# Patient Record
Sex: FEMALE | Race: WHITE | NOT HISPANIC OR LATINO | Employment: FULL TIME | URBAN - METROPOLITAN AREA
[De-identification: names, ages, dates, MRNs, and addresses within clinical notes are randomized per-mention and may not be internally consistent; named-entity substitution may affect disease eponyms.]

---

## 2017-11-09 ENCOUNTER — OFFICE VISIT (OUTPATIENT)
Dept: URGENT CARE | Facility: PHYSICIAN GROUP | Age: 27
End: 2017-11-09
Payer: COMMERCIAL

## 2017-11-09 VITALS
DIASTOLIC BLOOD PRESSURE: 72 MMHG | RESPIRATION RATE: 16 BRPM | BODY MASS INDEX: 35.16 KG/M2 | HEART RATE: 90 BPM | WEIGHT: 211 LBS | OXYGEN SATURATION: 98 % | SYSTOLIC BLOOD PRESSURE: 122 MMHG | TEMPERATURE: 100.1 F | HEIGHT: 65 IN

## 2017-11-09 DIAGNOSIS — R50.9 FEVER, UNSPECIFIED FEVER CAUSE: ICD-10-CM

## 2017-11-09 DIAGNOSIS — R11.0 NAUSEA: ICD-10-CM

## 2017-11-09 LAB
FLUAV+FLUBV AG SPEC QL IA: NORMAL
INT CON NEG: NEGATIVE
INT CON POS: POSITIVE

## 2017-11-09 PROCEDURE — 87804 INFLUENZA ASSAY W/OPTIC: CPT | Performed by: NURSE PRACTITIONER

## 2017-11-09 PROCEDURE — 99204 OFFICE O/P NEW MOD 45 MIN: CPT | Performed by: NURSE PRACTITIONER

## 2017-11-09 RX ORDER — ONDANSETRON 4 MG/1
4 TABLET, ORALLY DISINTEGRATING ORAL EVERY 8 HOURS PRN
Qty: 10 TAB | Refills: 0 | Status: SHIPPED | OUTPATIENT
Start: 2017-11-09 | End: 2020-06-17

## 2017-11-09 ASSESSMENT — ENCOUNTER SYMPTOMS
VOMITING: 0
EYE PAIN: 0
SORE THROAT: 0
FEVER: 1
HEADACHES: 1
SHORTNESS OF BREATH: 0
CHILLS: 1
NAUSEA: 1
ABDOMINAL PAIN: 1
MYALGIAS: 0
DIARRHEA: 1
DIZZINESS: 0

## 2017-11-09 NOTE — LETTER
November 9, 2017         Patient: Amber Hobbs   YOB: 1990   Date of Visit: 11/9/2017           To Whom it May Concern:    Amber Hobbs was seen in my clinic on 11/9/2017. She may return to work on 11/11/17.    If you have any questions or concerns, please don't hesitate to call.        Sincerely,           JORDAN Odonnell.  Electronically Signed

## 2017-11-09 NOTE — PROGRESS NOTES
"Subjective:   Amber Dominguez a 26 y.o. female who presents for Diarrhea (nausea, minor dizziness x1 week)        Diarrhea    This is a new problem. The current episode started in the past 7 days. The problem occurs 2 to 4 times per day. The problem has been gradually worsening. The stool consistency is described as watery. The patient states that diarrhea does not awaken her from sleep. Associated symptoms include abdominal pain, chills, a fever and headaches. Pertinent negatives include no myalgias or vomiting. Nothing aggravates the symptoms. There are no known risk factors. She has tried bismuth subsalicylate for the symptoms. The treatment provided no relief.     Review of Systems   Constitutional: Positive for chills, fever and malaise/fatigue.   HENT: Negative for sore throat.    Eyes: Negative for pain.   Respiratory: Negative for shortness of breath.    Cardiovascular: Negative for chest pain.   Gastrointestinal: Positive for abdominal pain, diarrhea and nausea. Negative for vomiting.   Genitourinary: Negative for hematuria.   Musculoskeletal: Negative for myalgias.   Skin: Negative for rash.   Neurological: Positive for headaches. Negative for dizziness.     No Known Allergies   Objective:   /72   Pulse 90   Temp 37.8 °C (100.1 °F)   Resp 16   Ht 1.651 m (5' 5\")   Wt 95.7 kg (211 lb)   SpO2 98%   Breastfeeding? No   BMI 35.11 kg/m²   Physical Exam   Constitutional: She is oriented to person, place, and time. She appears well-developed and well-nourished. No distress.   HENT:   Head: Normocephalic and atraumatic.   Right Ear: Tympanic membrane normal.   Left Ear: Tympanic membrane normal.   Mouth/Throat: Oropharynx is clear and moist. No tonsillar exudate.   Eyes: Conjunctivae and EOM are normal. Pupils are equal, round, and reactive to light.   Cardiovascular: Normal rate and regular rhythm.    No murmur heard.  Pulmonary/Chest: Effort normal and breath sounds normal. No respiratory " distress.   Abdominal: Soft. Bowel sounds are normal. She exhibits no distension. There is no hepatosplenomegaly. There is no tenderness. There is no rigidity, no rebound and no guarding.   Neurological: She is alert and oriented to person, place, and time. She has normal reflexes. No sensory deficit.   Skin: Skin is warm and dry.   Psychiatric: She has a normal mood and affect.         Assessment/Plan:   Assessment    1. Fever, unspecified fever cause  POCT Influenza A/B    ondansetron (ZOFRAN ODT) 4 MG TABLET DISPERSIBLE   2. Nausea     Influenza negative.  Encouraged to continue supportive care, may continue Pepto-Bismol as needed, Zofran for nausea.  It was explained to the pt. Today that due to the viral nature of the pt's illness, we will treat symptomatically today. Encouraged OTC supportive meds PRN.   ER precautions given- if worsening, develops SOB, muffled voice, any respiratory distress, or CP they are to go directly to the ER if we are not available.   Pt. Understands and agrees with the plan.   RTC as needed or to their PCP.

## 2019-11-21 ENCOUNTER — OFFICE VISIT (OUTPATIENT)
Dept: URGENT CARE | Facility: CLINIC | Age: 29
End: 2019-11-21
Payer: COMMERCIAL

## 2019-11-21 VITALS
HEIGHT: 65 IN | WEIGHT: 197 LBS | SYSTOLIC BLOOD PRESSURE: 122 MMHG | DIASTOLIC BLOOD PRESSURE: 78 MMHG | OXYGEN SATURATION: 99 % | BODY MASS INDEX: 32.82 KG/M2 | TEMPERATURE: 99.6 F | HEART RATE: 68 BPM

## 2019-11-21 DIAGNOSIS — R05.9 COUGH: ICD-10-CM

## 2019-11-21 DIAGNOSIS — J01.00 ACUTE NON-RECURRENT MAXILLARY SINUSITIS: ICD-10-CM

## 2019-11-21 PROCEDURE — 99214 OFFICE O/P EST MOD 30 MIN: CPT | Performed by: PHYSICIAN ASSISTANT

## 2019-11-22 RX ORDER — BENZONATATE 100 MG/1
100-200 CAPSULE ORAL 3 TIMES DAILY PRN
Qty: 60 CAP | Refills: 0 | Status: SHIPPED | OUTPATIENT
Start: 2019-11-22 | End: 2020-06-17

## 2019-11-22 RX ORDER — AMOXICILLIN AND CLAVULANATE POTASSIUM 875; 125 MG/1; MG/1
1 TABLET, FILM COATED ORAL 2 TIMES DAILY
Qty: 14 TAB | Refills: 0 | Status: SHIPPED | OUTPATIENT
Start: 2019-11-21 | End: 2019-11-28

## 2019-11-22 ASSESSMENT — ENCOUNTER SYMPTOMS
DIZZINESS: 0
DIARRHEA: 0
MYALGIAS: 0
MUSCULOSKELETAL NEGATIVE: 1
VOMITING: 0
HEADACHES: 1
SORE THROAT: 1
HEMOPTYSIS: 0
CHILLS: 1
RHINORRHEA: 1
SHORTNESS OF BREATH: 0
FEVER: 0
NAUSEA: 0
WHEEZING: 0
SPUTUM PRODUCTION: 0
ABDOMINAL PAIN: 0
COUGH: 1
SINUS PAIN: 1

## 2019-11-22 ASSESSMENT — COPD QUESTIONNAIRES: COPD: 0

## 2019-11-22 NOTE — PROGRESS NOTES
"Subjective:      Amber Hobbs is a 29 y.o. female who presents with Cough (cough, cold, sore throat)            Cough   This is a new problem. The current episode started 1 to 4 weeks ago (2 weeks). The problem has been unchanged. The problem occurs every few minutes. The cough is productive of brown sputum and productive of sputum. Associated symptoms include chills, headaches, nasal congestion, postnasal drip, rhinorrhea and a sore throat. Pertinent negatives include no chest pain, ear pain, fever, hemoptysis, myalgias, rash, shortness of breath or wheezing. Nothing aggravates the symptoms. She has tried OTC cough suppressant for the symptoms. The treatment provided mild relief. There is no history of asthma, COPD or pneumonia.       Review of Systems   Constitutional: Positive for chills. Negative for fever.   HENT: Positive for congestion, postnasal drip, rhinorrhea, sinus pain and sore throat. Negative for ear pain.    Respiratory: Positive for cough. Negative for hemoptysis, sputum production, shortness of breath and wheezing.    Cardiovascular: Negative for chest pain.   Gastrointestinal: Negative for abdominal pain, diarrhea, nausea and vomiting.   Genitourinary: Negative.    Musculoskeletal: Negative.  Negative for myalgias.   Skin: Negative for rash.   Neurological: Positive for headaches. Negative for dizziness.        Objective:     /78   Pulse 68   Temp 37.6 °C (99.6 °F)   Ht 1.651 m (5' 5\")   Wt 89.4 kg (197 lb)   SpO2 99%   BMI 32.78 kg/m²      Physical Exam  Vitals signs and nursing note reviewed.   Constitutional:       General: She is not in acute distress.     Appearance: Normal appearance. She is well-developed. She is not diaphoretic.   HENT:      Head: Normocephalic and atraumatic.      Right Ear: Hearing, tympanic membrane, ear canal and external ear normal. There is no impacted cerumen.      Left Ear: Hearing, tympanic membrane, ear canal and external ear normal. There is " no impacted cerumen.      Nose: Mucosal edema, congestion and rhinorrhea present.      Right Sinus: Maxillary sinus tenderness present. No frontal sinus tenderness.      Left Sinus: Maxillary sinus tenderness present. No frontal sinus tenderness.      Mouth/Throat:      Mouth: Mucous membranes are moist.      Pharynx: No oropharyngeal exudate or posterior oropharyngeal erythema.   Eyes:      Conjunctiva/sclera: Conjunctivae normal.      Pupils: Pupils are equal, round, and reactive to light.   Neck:      Musculoskeletal: Normal range of motion.   Cardiovascular:      Rate and Rhythm: Normal rate.      Heart sounds: Normal heart sounds. No murmur.   Pulmonary:      Effort: Pulmonary effort is normal.      Breath sounds: Normal breath sounds. No wheezing.   Musculoskeletal: Normal range of motion.   Skin:     General: Skin is warm and dry.   Neurological:      Mental Status: She is alert and oriented to person, place, and time.   Psychiatric:         Behavior: Behavior normal.            PMH:  has no past medical history on file.  MEDS:   Current Outpatient Medications:   •  amoxicillin-clavulanate (AUGMENTIN) 875-125 MG Tab, Take 1 Tab by mouth 2 times a day for 7 days., Disp: 14 Tab, Rfl: 0  •  benzonatate (TESSALON) 100 MG Cap, Take 1-2 Caps by mouth 3 times a day as needed., Disp: 60 Cap, Rfl: 0  •  etonogestrel (NEXPLANON) 68 MG Implant implant, Inject 1 Each as instructed Once., Disp: , Rfl:   •  ondansetron (ZOFRAN ODT) 4 MG TABLET DISPERSIBLE, Take 1 Tab by mouth every 8 hours as needed., Disp: 10 Tab, Rfl: 0  ALLERGIES: No Known Allergies  SURGHX: History reviewed. No pertinent surgical history.  SOCHX:  reports that she has never smoked. She has never used smokeless tobacco. She reports current alcohol use. She reports that she does not use drugs.  FH: family history is not on file.       Assessment/Plan:       1. Acute non-recurrent maxillary sinusitis  - amoxicillin-clavulanate (AUGMENTIN) 875-125 MG Tab;  Take 1 Tab by mouth 2 times a day for 7 days.  Dispense: 14 Tab; Refill: 0   - Complete full course of antibiotics as prescribed     Discussed use of nedi-pot, humidifier, OTC decongestant, and Flonase nasal spray for symptomatic relief. Tessalon perles as needed for cough. Call or return to office if symptoms persist or worsen. The patient demonstrated a good understanding and agreed with the treatment plan.      2. Cough  - benzonatate (TESSALON) 100 MG Cap; Take 1-2 Caps by mouth 3 times a day as needed.  Dispense: 60 Cap; Refill: 0

## 2020-06-17 ENCOUNTER — HOSPITAL ENCOUNTER (EMERGENCY)
Facility: MEDICAL CENTER | Age: 30
End: 2020-06-17
Attending: EMERGENCY MEDICINE
Payer: COMMERCIAL

## 2020-06-17 VITALS
HEIGHT: 65 IN | OXYGEN SATURATION: 98 % | RESPIRATION RATE: 18 BRPM | HEART RATE: 70 BPM | TEMPERATURE: 99 F | BODY MASS INDEX: 35.59 KG/M2 | WEIGHT: 213.63 LBS | SYSTOLIC BLOOD PRESSURE: 119 MMHG | DIASTOLIC BLOOD PRESSURE: 77 MMHG

## 2020-06-17 DIAGNOSIS — S09.90XA CLOSED HEAD INJURY, INITIAL ENCOUNTER: ICD-10-CM

## 2020-06-17 PROCEDURE — 99282 EMERGENCY DEPT VISIT SF MDM: CPT

## 2020-06-17 RX ORDER — NAPROXEN SODIUM 220 MG
440 TABLET ORAL
COMMUNITY

## 2020-06-17 NOTE — ED NOTES
Pt given discharge instructions. RN answered questions. VSS. Pt ambulated steadily out to [facility].

## 2020-06-17 NOTE — LETTER
"  FORM C-4:  EMPLOYEE’S CLAIM FOR COMPENSATION/ REPORT OF INITIAL TREATMENT  EMPLOYEE’S CLAIM - PROVIDE ALL INFORMATION REQUESTED   First Name Amber Last Name Savanah Hobbs Birthdate 1990  Sex female Claim Number   Home Employee Address [address]                                     Zip  [address] Height  1.651 m (5' 5\") Weight  96.9 kg (213 lb 10 oz) [facility]     Mailing Employee Address [address]               Zip  [address] Telephone  [phone] (home)  Primary Language Spoken   Insurer  [employer] Third Party   ADRIANNA CLAIMS [employer] Employee's Occupation (Job 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 (if applicable) Lifting 40lb water    How did this injury or occupational disease occur? Be specific and answer in detail. Use additional sheet if necessary)  Lifitng case of water, over compensated for the weight of the water and head hit the steel rack. No loss of conciousness but immidiate blurry vison and pain   If you believe that you have an occupational disease, when did you first have knowledge of the disability and it relationship to your employment? n/a Witnesses to the Accident  n/a   Nature of Injury or Occupational Disease  Contusion Part(s) of Body Injured or Affected  Skull, N/A, N/A    I CERTIFY THAT THE ABOVE IS TRUE AND CORRECT TO THE BEST OF MY KNOWLEDGE AND THAT I HAVE PROVIDED THIS INFORMATION IN ORDER TO OBTAIN THE " BENEFITS OF [place]).  I HEREBY AUTHORIZE ANY PHYSICIAN, CHIROPRACTOR, SURGEON, PRACTITIONER, OR OTHER PERSON, ANY HOSPITAL, INCLUDING [facility] OR [facility], ANY MEDICAL SERVICE ORGANIZATION, ANY INSURANCE COMPANY, OR OTHER INSTITUTION OR ORGANIZATION TO RELEASE TO EACH OTHER, ANY MEDICAL OR OTHER INFORMATION, INCLUDING BENEFITS PAID OR PAYABLE, PERTINENT TO THIS INJURY OR DISEASE, EXCEPT INFORMATION RELATIVE TO DIAGNOSIS, TREATMENT AND/OR COUNSELING FOR AIDS, PSYCHOLOGICAL CONDITIONS, ALCOHOL OR CONTROLLED SUBSTANCES, FOR WHICH I MUST GIVE SPECIFIC AUTHORIZATION.  A PHOTOSTAT OF THIS AUTHORIZATION SHALL BE AS VALID AS THE ORIGINAL.  Date June 17, 2020       Place [facility]                Employee’s Signature   THIS REPORT MUST BE COMPLETED AND MAILED WITHIN 3 WORKING DAYS OF TREATMENT   Place [facility], EMERGENCY DEPT                                                                             Name of Facility [facility]   Date  6/17/2020 Diagnosis  (S09.90XA) Closed head injury, initial encounter Is there evidence the injured employee was under the influence of alcohol and/or another controlled substance at the time of accident?   Hour  1:17 PM Description of Injury or Disease  Closed head injury, initial encounter No   Treatment  Was evaluated the emergency department has no significant findings.  At this point is recommended head injury instructions to return if any symptoms worsen follow-up with occupational health if there is any continued symptoms in 1 week  Have you advised the patient to remain off work five days or more?         No   X-Ray Findings    If Yes   From Date    To Date      From information given by the employee, together with medical evidence, can you directly connect this injury or occupational disease as  "job incurred? Yes If No, is employee capable of: Full Duty  Yes Modified Duty      Is additional medical care by a physician indicated? Yes  Comments:If symptoms continue greater than 2 days recommend to follow-up with occupational health If Modified Duty, Specify any Limitations / Restrictions       Do you know of any previous injury or disease contributing to this condition or occupational disease? No    Date 6/17/2020 Print Doctor’s Name Jose Alfredo Maravilla certify the employer’s copy of this form was mailed on:   Address [address] [place] [id number] [phone] INSURER’S USE ONLY   Provider’s Tax ID Number [id number] Telephone Dept: [phone]    Doctor’s Signature e-JOSE ALFREDO Sepulveda M.D. Degree     MD      Form C-4 (rev.10/07)                                                                         BRIEF DESCRIPTION OF RIGHTS AND BENEFITS  (Pursuant to [place] 616[place].050)    Notice of Injury or Occupational Disease (Incident Report Form C-1): If an injury or occupational disease (OD) arises out of and in the course of employment, you must provide written notice to your employer as soon as practicable, but no later than 7 days after the accident or OD. Your employer shall maintain a sufficient supply of the required forms.    Claim for Compensation (Form C-4): If medical treatment is sought, the form C-4 is available at the place of initial treatment. A completed \"Claim for Compensation\" (Form C-4) must be filed within 90 days after an accident or OD. The treating physician or chiropractor must, within 3 working days after treatment, complete and mail to the employer, the employer's insurer and third-party , the Claim for Compensation.    Medical Treatment: If you require medical treatment for your on-the-job injury or OD, you may be required to select a physician or chiropractor from a list provided by your workers’ compensation insurer, if it has contracted with an Organization for " Managed Care (MCO) or Preferred Provider Organization (PPO) or providers of health care. If your employer has not entered into a contract with an MCO or PPO, you may select a physician or chiropractor from the Panel of Physicians and Chiropractors. Any medical costs related to your industrial injury or OD will be paid by your insurer.    Temporary Total Disability (TTD): If your doctor has certified that you are unable to work for a period of at least 5 consecutive days, or 5 cumulative days in a 20-day period, or places restrictions on you that your employer does not accommodate, you may be entitled to TTD compensation.    Temporary Partial Disability (TPD): If the wage you receive upon reemployment is less than the compensation for TTD to which you are entitled, the insurer may be required to pay you TPD compensation to make up the difference. TPD can only be paid for a maximum of 24 months.    Permanent Partial Disability (PPD): When your medical condition is stable and there is an indication of a PPD as a result of your injury or OD, within 30 days, your insurer must arrange for an evaluation by a rating physician or chiropractor to determine the degree of your PPD. The amount of your PPD award depends on the date of injury, the results of the PPD evaluation and your age and wage.    Permanent Total Disability (PTD): If you are medically certified by a treating physician or chiropractor as permanently and totally disabled and have been granted a PTD status by your insurer, you are entitled to receive monthly benefits not to exceed 66 2/3% of your average monthly wage. The amount of your PTD payments is subject to reduction if you previously received a PPD award.    Vocational Rehabilitation Services: You may be eligible for vocational rehabilitation services if you are unable to return to the job due to a permanent physical impairment or permanent restrictions as a result of your injury or occupational  disease.    Transportation and Per Lucila Reimbursement: You may be eligible for travel expenses and per lucila associated with medical treatment.    Reopening: You may be able to reopen your claim if your condition worsens after claim closure.     Appeal Process: If you disagree with a written determination issued by the insurer or the insurer does not respond to your request, you may appeal to the Department of Administration, , by following the instructions contained in your determination letter. You must appeal the determination within 70 days from the date of the determination letter at [address], [address], [place], [place] [address], or [address], [address], [place], [place] [address]. If you disagree with the  decision, you may appeal to the Department of Administration, . You must file your appeal within 30 days from the date of the  decision letter at [address], [address], [place], [place] [address], or [address], [place], [place] [address]. If you disagree with a decision of an , you may file a petition for judicial review with the District Court. You must do so within 30 days of the Appeal Officer’s decision. You may be represented by an  at your own expense or you may contact the [facility] for possible representation.    [place]  for Injured Workers (NAIW): If you disagree with a  decision, you may request that NAIW represent you without charge at an  Hearing. For information regarding denial of benefits, you may contact the [facility] at: [address], [address], [place] [address], [phone], or [address] [facility] [address] [facility] [address], [place] [address], [phone]    To File a Complaint with the Division: If you wish to file a complaint with the  of the Division of Industrial Relations (DIR),  please contact the  Workers’ Compensation Section, [address], [address], [place], [place] [address], telephone (006) [phone], or [address], [address], [place], [place] [address], telephone [phone].    For assistance with Workers’ Compensation Issues: You may contact the Office of the Governor Consumer Health Assistance, [address] [place] [address], [address], [place], [place] [address], Toll Free 1-660.183.8853, Web site: http://[employer].nv., E-mail maria [email or web address].  D-2 (rev. 06/18)              __________________________________________________________________                                    June 17, 2020            Employee Name / Signature                                                                                                                            Date

## 2020-06-17 NOTE — LETTER
"  FORM C-4:  EMPLOYEE’S CLAIM FOR COMPENSATION/ REPORT OF INITIAL TREATMENT  EMPLOYEE’S CLAIM - PROVIDE ALL INFORMATION REQUESTED   First Name Amber Last Name Savanah Hobbs Birthdate 1990  Sex female Claim Number   Home Employee Address [address]                                     Zip  [address] Height  1.651 m (5' 5\") Weight  96.9 kg (213 lb 10 oz) [facility]  [id number]   Mailing Employee Address [address]               Zip  [address] Telephone  [phone] (home)  Primary Language Spoken   Insurer  *** Third Party   ADRIANNA CLAIMS [employer] Employee's Occupation (Job Title) 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 (if applicable) Lifting 40lb water    How did this injury or occupational disease occur? Be specific and answer in detail. Use additional sheet if necessary)  Lifitng case of water, over compensated for the weight of the water and head hit the steel rack. No loss of conciousness but immidiate blurry vison and pain   If you believe that you have an occupational disease, when did you first have knowledge of the disability and it relationship to your employment? n/a Witnesses to the Accident  n/a   Nature of Injury or Occupational Disease  Contusion Part(s) of Body Injured or Affected  Skull, N/A, N/A    I CERTIFY THAT THE ABOVE IS TRUE AND CORRECT TO THE BEST OF MY KNOWLEDGE AND THAT I HAVE PROVIDED THIS INFORMATION IN ORDER TO OBTAIN THE BENEFITS OF [place]).  I HEREBY AUTHORIZE ANY PHYSICIAN, CHIROPRACTOR, SURGEON, PRACTITIONER, OR OTHER PERSON, ANY HOSPITAL, INCLUDING [facility] OR [facility], ANY MEDICAL SERVICE ORGANIZATION, ANY INSURANCE COMPANY, OR OTHER INSTITUTION OR ORGANIZATION TO RELEASE TO EACH OTHER, ANY MEDICAL OR OTHER INFORMATION, INCLUDING BENEFITS PAID OR PAYABLE, PERTINENT TO THIS INJURY OR DISEASE, EXCEPT INFORMATION RELATIVE TO DIAGNOSIS, TREATMENT AND/OR COUNSELING FOR AIDS, PSYCHOLOGICAL CONDITIONS, ALCOHOL OR CONTROLLED SUBSTANCES, FOR WHICH I MUST GIVE SPECIFIC AUTHORIZATION.  A PHOTOSTAT OF THIS AUTHORIZATION SHALL BE AS VALID AS THE ORIGINAL.  Date                                      Place                                                                             Employee’s Signature   THIS REPORT MUST BE COMPLETED AND MAILED WITHIN 3 WORKING DAYS OF TREATMENT   Place [facility], EMERGENCY DEPT                                                                             Name of Facility [facility]   Date  6/17/2020 Diagnosis  (S09.90XA) Closed head injury, initial encounter Is there evidence the injured employee was under the influence of alcohol and/or another controlled substance at the time of accident?   Hour  1:13 PM Description of Injury or Disease  Closed head injury, initial encounter     Treatment     Have you advised the patient to remain off work five days or more?             X-Ray Findings    If Yes   From Date    To Date      From information given by the employee, together with medical evidence, can you directly connect this injury or occupational disease as job incurred?   If No, is employee capable of: Full Duty    Modified Duty      Is additional medical care by a physician indicated?   If Modified Duty, Specify any Limitations / Restrictions       Do  "you know of any previous injury or disease contributing to this condition or occupational disease?      Date 6/17/2020 Print Doctor’s Name Maravilla, Jose Alfredo RIGGS RADHA certify the employer’s copy of this form was mailed on:   Address [address] [id number] [phone] INSURER’S USE ONLY   Provider’s Tax ID Number [id number] Telephone Dept: [phone]    Doctor’s Signature   Degree        Form C-4 (rev.10/07)                                                                         BRIEF DESCRIPTION OF RIGHTS AND BENEFITS  (Pursuant to [place] 616C.050)    Notice of Injury or Occupational Disease (Incident Report Form C-1): If an injury or occupational disease (OD) arises out of and in the course of employment, you must provide written notice to your employer as soon as practicable, but no later than 7 days after the accident or OD. Your employer shall maintain a sufficient supply of the required forms.    Claim for Compensation (Form C-4): If medical treatment is sought, the form C-4 is available at the place of initial treatment. A completed \"Claim for Compensation\" (Form C-4) must be filed within 90 days after an accident or OD. The treating physician or chiropractor must, within 3 working days after treatment, complete and mail to the employer, the employer's insurer and third-party , the Claim for Compensation.    Medical Treatment: If you require medical treatment for your on-the-job injury or OD, you may be required to select a physician or chiropractor from a list provided by your workers’ compensation insurer, if it has contracted with an Organization for Managed Care (MCO) or Preferred Provider Organization (PPO) or providers of health care. If your employer has not entered into a contract with an MCO or PPO, you may select a physician or chiropractor from the Panel of Physicians and Chiropractors. Any medical costs related to your industrial injury or OD will be paid by your " insurer.    Temporary Total Disability (TTD): If your doctor has certified that you are unable to work for a period of at least 5 consecutive days, or 5 cumulative days in a 20-day period, or places restrictions on you that your employer does not accommodate, you may be entitled to TTD compensation.    Temporary Partial Disability (TPD): If the wage you receive upon reemployment is less than the compensation for TTD to which you are entitled, the insurer may be required to pay you TPD compensation to make up the difference. TPD can only be paid for a maximum of 24 months.    Permanent Partial Disability (PPD): When your medical condition is stable and there is an indication of a PPD as a result of your injury or OD, within 30 days, your insurer must arrange for an evaluation by a rating physician or chiropractor to determine the degree of your PPD. The amount of your PPD award depends on the date of injury, the results of the PPD evaluation and your age and wage.    Permanent Total Disability (PTD): If you are medically certified by a treating physician or chiropractor as permanently and totally disabled and have been granted a PTD status by your insurer, you are entitled to receive monthly benefits not to exceed 66 2/3% of your average monthly wage. The amount of your PTD payments is subject to reduction if you previously received a PPD award.    Vocational Rehabilitation Services: You may be eligible for vocational rehabilitation services if you are unable to return to the job due to a permanent physical impairment or permanent restrictions as a result of your injury or occupational disease.    Transportation and Per Lucila Reimbursement: You may be eligible for travel expenses and per lucila associated with medical treatment.    Reopening: You may be able to reopen your claim if your condition worsens after claim closure.     Appeal Process: If you disagree with a written determination issued by the insurer or the  insurer does not respond to your request, you may appeal to the Department of Administration, , by following the instructions contained in your determination letter. You must appeal the determination within 70 days from the date of the determination letter at [address], [address], [place], [place] [address], or [address], [address], [place], [place] [address]. If you disagree with the  decision, you may appeal to the Department of Administration, . You must file your appeal within 30 days from the date of the  decision letter at [address], [address], [place], [place] [address], or [address], [address], [place], [place] [address]. If you disagree with a decision of an , you may file a petition for judicial review with the District Court. You must do so within 30 days of the Appeal Officer’s decision. You may be represented by an  at your own expense or you may contact the [facility] for possible representation.    [place]  for Injured Workers (NAIW): If you disagree with a  decision, you may request that NAIW represent you without charge at an  Hearing. For information regarding denial of benefits, you may contact the [facility] at: [address], [address], [place] [address], [phone], or [address], [place] [address], [phone]    To File a Complaint with the Division: If you wish to file a complaint with the  of the Division of Industrial Relations (DIR),  please contact the Workers’ Compensation Section, [address], [address], [place], [place] [address], telephone [phone], or [address], [address], [place], [place] [address], telephone [phone].    For assistance with Workers’ Compensation Issues: You may contact the Office of the Governor Consumer Health Assistance, [address], [address], [place], [place] [address], Toll Free 1-718.602.4610, Web site: [email or web address]., E-mail maria [email or web address].  D-2 (rev. 06/18)              __________________________________________________________________                                    _________________            Employee Name / Signature                                                                                                                            Date

## 2020-06-17 NOTE — ED PROVIDER NOTES
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 female who presents to the ED with complaints of head pain.  The patient was at [employer] where she works she was picking up products stood up and hit her head on shelving at about 8:00 this morning.  The patient did see stars felt head pain but denies any loss of consciousness.  Patient states that she still feels \"out of the body\" and still complaining of pain.  Pretty much in the right posterior aspect of the head was actually feeling improved initially she was nauseated now she is no longer having any nausea.  Currently she denies any visual acuity changes denies any nausea vomiting she does describe right-sided posterior head pain I does feel slow and sluggish which is not unlike her when she gets a migraine headache.  Patient denies any other symptoms presents for evaluation.    REVIEW OF SYSTEMS  See HPI for further details. All other systems are negative.     PAST MEDICAL HISTORY  History reviewed. No pertinent past medical history.    FAMILY HISTORY  History reviewed. No pertinent family history.    SOCIAL HISTORY  Social History     Socioeconomic History   • Marital status:      Spouse name: Not on file   • Number of children: Not on file   • Years of education: Not on file   • Highest education level: Not on file   Occupational History   • Not on file   Social Needs   • Financial resource strain: Not on file   • Food insecurity     Worry: Not on file     Inability: Not on file   • Transportation needs     Medical: Not on file     Non-medical: Not on file   Tobacco Use   • Smoking status: Never Smoker   • Smokeless tobacco: Never Used   Substance and Sexual Activity   • Alcohol use: Yes     Comment: socially   • Drug use: Yes     Types: " "Inhaled     Comment: THC   • Sexual activity: Yes     Partners: Male     Birth control/protection: Implant   Lifestyle   • Physical activity     Days per week: Not on file     Minutes per session: Not on file   • Stress: Not on file   Relationships   • Social connections     Talks on phone: Not on file     Gets together: Not on file     Attends Islam service: Not on file     Active member of club or organization: Not on file     Attends meetings of clubs or organizations: Not on file     Relationship status: Not on file   • Intimate partner violence     Fear of current or ex partner: Not on file     Emotionally abused: Not on file     Physically abused: Not on file     Forced sexual activity: Not on file   Other Topics Concern   • Not on file   Social History Narrative   • Not on file      Pcp Pt States None  The patient denies any significant tobacco, alcohol or drug use    SURGICAL HISTORY  History reviewed. No pertinent surgical history.    CURRENT MEDICATIONS  Home Medications    **Home medications have not yet been reviewed for this encounter**       No current facility-administered medications on file prior to encounter.      Current Outpatient Medications on File Prior to Encounter   Medication Sig Dispense Refill   • benzonatate (TESSALON) 100 MG Cap Take 1-2 Caps by mouth 3 times a day as needed. 60 Cap 0   • etonogestrel (NEXPLANON) 68 MG Implant implant Inject 1 Each as instructed Once.     • ondansetron (ZOFRAN ODT) 4 MG TABLET DISPERSIBLE Take 1 Tab by mouth every 8 hours as needed. 10 Tab 0         ALLERGIES  No Known Allergies    PHYSICAL EXAM  VITAL SIGNS: /84   Pulse 79   Temp 37.2 °C (99 °F) (Temporal)   Resp 16   Ht 1.651 m (5' 5\")   Wt 96.9 kg (213 lb 10 oz)   SpO2 99%   Breastfeeding No   BMI 35.55 kg/m²    Pulse Oximetry was obtained. It showed a reading of Pulse Oximetry: 99 %.  I interpreted this as non hypoxic.     Constitutional: Well developed, Well nourished, No acute " distress, Non-toxic appearance.   HENT: Normocephalic, Atraumatic no signs of cephalhematoma, Bilateral external ears normal, bilateral tympanic membranes normal, Oropharynx mucous membranes, No oral exudates, Nose normal.   Eyes:  conjunctiva is normal, there are no signs of exudate.   Neck: Supple, no cervical lymphadenopathy, no meningeal signs.. Tender midline  Lymphatic: No lymphadenopathy noted.   Cardiovascular: Regular rate and rhythm without murmurs gallops or rubs.   Thorax & Lungs: Lungs are clear to auscultation bilaterally, there are no wheezes no rales. Chest wall is nontender.  Abdomen: Soft, nontender nondistended. Bowel sounds are present.   Skin: Warm, Dry, No erythema,   Back: No tenderness, No CVA tenderness.  Musculoskeletal: Good range of motion in all major joints. No tenderness to palpation or major deformities noted. Intact distal pulses, no clubbing, no cyanosis, no edema     Neurologic: Alert & oriented x 3, Normal motor function, Normal sensory function, No focal deficits noted.  Nerves II through XII grossly intact DTRs 2+ and equal throughout.  The patient has normal station normal gait grossly normal neurologic examination.  Psychiatric: Affect normal, Judgment normal, Mood normal.       RADIOLOGY/PROCEDURES  No orders to display             COURSE & MEDICAL DECISION MAKING  Pertinent Labs & Imaging studies reviewed. (See chart for details)  Patient presents emerge department for evaluation.  Clinically the patient appears normal there is no signs of significant head trauma the patient is otherwise neurologically intact and normal and actually improving from her time of injury.  At this point I do not feel CT scan is necessary.  I have given the patient as well as the patient's friend head injury instructions recommend return if there is any worsening symptoms otherwise follow-up with occupational health for recheck and 2 days as needed.    FINAL IMPRESSION  1. Closed head injury,  initial encounter            The patient will return for new or worsening symptoms and is stable at the time of discharge.    The patient is referred to a primary physician for blood pressure management, diabetic screening, and for all other preventative health concerns.        DISPOSITION:  Patient will be discharged home in stable condition.    FOLLOW UP:  [facility] on [address] [address] [phone]          OUTPATIENT MEDICATIONS:  New Prescriptions    No medications on file               Electronically signed by: Jose Alfredo Maravilla M.D., 6/17/2020 1:16 PM

## 2021-03-23 ENCOUNTER — NON-PROVIDER VISIT (OUTPATIENT)
Dept: OCCUPATIONAL MEDICINE | Facility: CLINIC | Age: 31
End: 2021-03-23

## 2021-03-23 DIAGNOSIS — Z02.1 PRE-EMPLOYMENT DRUG SCREENING: ICD-10-CM

## 2021-03-23 PROCEDURE — 80305 DRUG TEST PRSMV DIR OPT OBS: CPT | Performed by: NURSE PRACTITIONER

## 2021-03-25 LAB
AMP AMPHETAMINE: NORMAL
COC COCAINE: NORMAL
INT CON NEG: NORMAL
INT CON POS: NORMAL
MET METHAMPHETAMINES: NORMAL
OPI OPIATES: NORMAL
PCP PHENCYCLIDINE: NORMAL
POC DRUG COMMENT 753798-OCCUPATIONAL HEALTH: NORMAL
THC: NORMAL

## 2025-06-26 ENCOUNTER — HOSPITAL ENCOUNTER (OUTPATIENT)
Dept: RADIOLOGY | Facility: MEDICAL CENTER | Age: 35
End: 2025-06-26

## 2025-06-26 DIAGNOSIS — T14.8XXA FOREIGN MATERIAL: ICD-10-CM
